# Patient Record
Sex: FEMALE | Race: WHITE | URBAN - METROPOLITAN AREA
[De-identification: names, ages, dates, MRNs, and addresses within clinical notes are randomized per-mention and may not be internally consistent; named-entity substitution may affect disease eponyms.]

---

## 2022-03-18 PROBLEM — G47.00 INSOMNIA: Status: ACTIVE | Noted: 2019-10-29

## 2022-03-18 PROBLEM — R00.2 PALPITATIONS: Status: ACTIVE | Noted: 2021-01-13

## 2022-03-18 PROBLEM — L30.9 ECZEMA: Status: ACTIVE | Noted: 2019-10-29

## 2022-03-18 PROBLEM — R73.03 PRE-DIABETES: Status: ACTIVE | Noted: 2021-04-29

## 2022-03-18 PROBLEM — K64.8 OTHER HEMORRHOIDS: Status: ACTIVE | Noted: 2021-04-29

## 2022-03-18 PROBLEM — F41.9 ANXIETY: Status: ACTIVE | Noted: 2018-06-08

## 2022-03-18 PROBLEM — R41.840 ATTENTION DEFICIT: Status: ACTIVE | Noted: 2020-08-12

## 2022-03-18 PROBLEM — K59.09 OTHER CONSTIPATION: Status: ACTIVE | Noted: 2021-03-26

## 2022-03-19 PROBLEM — R10.9 PAIN IN THE ABDOMEN: Status: ACTIVE | Noted: 2018-03-21

## 2022-03-19 PROBLEM — H52.03 HYPEROPIA OF BOTH EYES: Status: ACTIVE | Noted: 2021-05-10

## 2022-03-19 PROBLEM — F41.0 PANIC ATTACK: Status: ACTIVE | Noted: 2019-10-29

## 2022-03-19 PROBLEM — F32.A DEPRESSIVE DISORDER: Status: ACTIVE | Noted: 2018-06-08

## 2022-03-19 PROBLEM — Z91.09 ALLERGY TO POLLEN: Status: ACTIVE | Noted: 2018-06-08

## 2022-03-19 PROBLEM — M77.9 TENDONITIS: Status: ACTIVE | Noted: 2020-03-06

## 2022-03-19 PROBLEM — R73.01 IMPAIRED FASTING BLOOD SUGAR: Status: ACTIVE | Noted: 2020-11-12

## 2022-03-19 PROBLEM — M25.852 IMPINGEMENT SYNDROME, HIP, LEFT: Status: ACTIVE | Noted: 2020-03-06

## 2022-03-19 PROBLEM — M47.816 FACET ARTHROPATHY, LUMBAR: Status: ACTIVE | Noted: 2020-11-24

## 2022-03-19 PROBLEM — K21.9 ESOPHAGEAL REFLUX: Status: ACTIVE | Noted: 2018-03-21

## 2022-03-20 PROBLEM — R53.83 OTHER FATIGUE: Status: ACTIVE | Noted: 2021-04-29

## 2022-03-20 PROBLEM — G89.29 CHRONIC GENERALIZED PAIN: Status: ACTIVE | Noted: 2017-12-27

## 2022-03-20 PROBLEM — K82.9 GALLBLADDER DISEASE: Status: ACTIVE | Noted: 2021-03-29

## 2022-03-20 PROBLEM — R52 CHRONIC GENERALIZED PAIN: Status: ACTIVE | Noted: 2017-12-27

## 2022-03-20 PROBLEM — K59.9 FUNCTIONAL BOWEL DISEASE: Status: ACTIVE | Noted: 2018-03-21

## 2022-03-20 PROBLEM — M54.50 LOW BACK PAIN: Status: ACTIVE | Noted: 2020-09-25

## 2022-06-02 ENCOUNTER — TELEPHONE (OUTPATIENT)
Dept: OBGYN CLINIC | Age: 47
End: 2022-06-02

## 2022-06-02 NOTE — TELEPHONE ENCOUNTER
Patient called with concerns regarding pelvic pain that she has been having. She states that she went for imaging at [facility] and was found to have kidney stone. She also was found to have a complex lesion on her ovary. Results are in [place]. She would like recommendations from Dr. Keely Li regarding this. Records from [facility] Everywhere: \" Incidental complex and complicated cystic lesion, right ovary measuring 3.0 cm. Is may be related to hemorrhagic cysts, although other lesion not excluded. Consider follow-up pelvic ultrasound in 6-8 weeks. \"

## 2022-06-02 NOTE — TELEPHONE ENCOUNTER
Pt calls back, she is aware of Dr Rosario Payne recommendations. She is scheduled in July for ultrasound and to follow up with Dr Osmany Stark. All questions answered, call back prn. Voiced full understanding.

## 2022-07-07 NOTE — PROGRESS NOTES
Shonna Fierro  is a 49 y.o. female, No obstetric history on file., No LMP recorded. Patient has had a hysterectomy. ,  who is seen for ultrasound follow up to evaluate pelvic pain. She had imaging performed at [facility] that showed: \"Incidental complex and complicated cystic lesion, right ovary measuring 3.0 cm. Is may be related to hemorrhagic cysts, although other lesion not excluded. Consider follow-up pelvic ultrasound in 6-8 weeks. \"    Pt was advised to return to our office in 6 weeks for ultrasound follow up. Pt has c/o headaches, breast enlargement, she is wondering if this is related to hormones. She states that she has been having pelvic pain mostly on her LLQ and lower back, she states that she does have pain at times in her RLQ. She states that she has seen urology as well. HISTORY:  Sexual History:  single partner, contraception - status post hysterectomy  Contraception:  status post hysterectomy  Current Outpatient Medications on File Prior to Visit   Medication Sig Dispense Refill    busPIRone (BUSPAR) 7.5 MG tablet Take 7.5 mg by mouth 2 times daily      naproxen sodium (ANAPROX) 550 MG tablet Take 550 mg by mouth as needed      Multiple Vitamin (MULTIVITAMIN PO) Take by mouth      MAGNESIUM PO Take by mouth      Cholecalciferol 50 MCG (2000 UT) TABS Take by mouth      fluticasone (FLONASE) 50 MCG/ACT nasal spray by Nasal route      LORazepam (ATIVAN) 0.5 MG tablet Take 0.5 mg by mouth as needed. (Patient not taking: Reported on 7/8/2022)       No current facility-administered medications on file prior to visit.        ROS:  Review of Camillejudy Zhengroderick  has a past medical history of Abnormal Pap smear, Abnormal Papanicolaou smear of cervix, Anemia, Anxiety, Chronic pain, Depressive disorder, Esophageal reflux, Fibromyalgia, GERD (gastroesophageal reflux disease), Hiatal hernia, Impaired fasting blood sugar, Insomnia, Osteoarthritis of cervical spine without myelopathy, Other hemorrhoids, Psychiatric disorder, Unspecified adverse effect of anesthesia, and UTI (urinary tract infection). .    Previous surgeries include  has a past surgical history that includes gyn (, ); LEEP;  section (2014); Hysterectomy; and Cholecystectomy, laparoscopic. Lili Adams Her current meds are   Current Outpatient Medications:     busPIRone (BUSPAR) 7.5 MG tablet, Take 7.5 mg by mouth 2 times daily, Disp: , Rfl:     naproxen sodium (ANAPROX) 550 MG tablet, Take 550 mg by mouth as needed, Disp: , Rfl:     Multiple Vitamin (MULTIVITAMIN PO), Take by mouth, Disp: , Rfl:     MAGNESIUM PO, Take by mouth, Disp: , Rfl:     Cholecalciferol 50 MCG (2000 UT) TABS, Take by mouth, Disp: , Rfl:     fluticasone (FLONASE) 50 MCG/ACT nasal spray, by Nasal route, Disp: , Rfl:     LORazepam (ATIVAN) 0.5 MG tablet, Take 0.5 mg by mouth as needed. (Patient not taking: Reported on 2022), Disp: , Rfl:      Family history is significant for family history includes Diabetes in her father; Stroke in her mother. .       PHYSICAL EXAM:  Blood pressure 104/62, not currently breastfeeding. OBGyn Exam   The patient appears well, alert, oriented x 3, in no distress. Ultrasound today shows uterus to be absent right ovary of probable collapsing cyst left ovary with a dominant follicle approximately 1 cm bilateral adnexa appear normal.  Please see complete report under imaging tab. ASSESSMENT:  Encounter Diagnoses   Name Primary?  Pelvic pain in female     History of ovarian cyst Yes       PLAN:  Discussed her chronic pain and recurrent ovarian cyst with exacerbation of increasing symptoms at this time. These are becoming more bothersome with her discussed that I do think this is due to ovarian hormone fluctuation of the perimenopausal timeframe. She is more interested and possible hormonal therapies to help regulate this. She is also very concerned about side effects.   At this point discussed I think trying to suppress her ovaries to avoid fluctuation and suppress cysts would be our best plan of action. At this point we will go ahead and put her on 5 mg of Aygestin a day to see if this will suppress things. Discussed we often use 10 mg but I think lower dose will be better tolerated by her and she agrees. She already has an appointment for the end of August I think would be a good time to follow-up and see how she is doing. Orders Placed This Encounter   Procedures    AMB POC US, TRANSVAGINAL     Order Specific Question:   Reason for Exam:     Answer:   GYN     Order Specific Question:   Are you Pregnant? Answer:    No

## 2022-07-08 ENCOUNTER — OFFICE VISIT (OUTPATIENT)
Dept: OBGYN CLINIC | Age: 47
End: 2022-07-08
Payer: OTHER GOVERNMENT

## 2022-07-08 VITALS — SYSTOLIC BLOOD PRESSURE: 104 MMHG | DIASTOLIC BLOOD PRESSURE: 62 MMHG

## 2022-07-08 DIAGNOSIS — Z87.42 HISTORY OF OVARIAN CYST: Primary | ICD-10-CM

## 2022-07-08 DIAGNOSIS — R10.2 PELVIC PAIN IN FEMALE: ICD-10-CM

## 2022-07-08 PROCEDURE — 76830 TRANSVAGINAL US NON-OB: CPT | Performed by: OBSTETRICS & GYNECOLOGY

## 2022-07-08 PROCEDURE — 99214 OFFICE O/P EST MOD 30 MIN: CPT | Performed by: OBSTETRICS & GYNECOLOGY

## 2022-07-08 RX ORDER — BUSPIRONE HYDROCHLORIDE 7.5 MG/1
7.5 TABLET ORAL 2 TIMES DAILY
COMMUNITY
Start: 2021-11-01

## 2022-07-08 RX ORDER — NAPROXEN SODIUM 550 MG/1
550 TABLET ORAL PRN
COMMUNITY
Start: 2022-07-06

## 2022-08-22 ENCOUNTER — OFFICE VISIT (OUTPATIENT)
Dept: OBGYN CLINIC | Age: 47
End: 2022-08-22
Payer: OTHER GOVERNMENT

## 2022-08-22 VITALS
SYSTOLIC BLOOD PRESSURE: 92 MMHG | BODY MASS INDEX: 22.36 KG/M2 | HEIGHT: 64 IN | WEIGHT: 131 LBS | DIASTOLIC BLOOD PRESSURE: 64 MMHG

## 2022-08-22 DIAGNOSIS — R10.2 PELVIC PAIN IN FEMALE: ICD-10-CM

## 2022-08-22 DIAGNOSIS — Z01.419 WELL WOMAN EXAM: Primary | ICD-10-CM

## 2022-08-22 DIAGNOSIS — Z87.42 HISTORY OF OVARIAN CYST: ICD-10-CM

## 2022-08-22 DIAGNOSIS — Z13.89 SCREENING FOR GENITOURINARY CONDITION: ICD-10-CM

## 2022-08-22 LAB
BILIRUBIN, URINE, POC: NEGATIVE
BLOOD URINE, POC: NEGATIVE
GLUCOSE URINE, POC: NEGATIVE
KETONES, URINE, POC: NEGATIVE
LEUKOCYTE ESTERASE, URINE, POC: NEGATIVE
NITRITE, URINE, POC: NEGATIVE
PH, URINE, POC: 5 (ref 4.6–8)
PROTEIN,URINE, POC: NEGATIVE
SPECIFIC GRAVITY, URINE, POC: 1.01 (ref 1–1.03)
URINALYSIS CLARITY, POC: CLEAR
URINALYSIS COLOR, POC: YELLOW
UROBILINOGEN, POC: NORMAL

## 2022-08-22 PROCEDURE — 81002 URINALYSIS NONAUTO W/O SCOPE: CPT | Performed by: OBSTETRICS & GYNECOLOGY

## 2022-08-22 PROCEDURE — 99396 PREV VISIT EST AGE 40-64: CPT | Performed by: OBSTETRICS & GYNECOLOGY

## 2022-08-22 ASSESSMENT — PATIENT HEALTH QUESTIONNAIRE - PHQ9
SUM OF ALL RESPONSES TO PHQ QUESTIONS 1-9: 0
SUM OF ALL RESPONSES TO PHQ QUESTIONS 1-9: 0
SUM OF ALL RESPONSES TO PHQ9 QUESTIONS 1 & 2: 0
SUM OF ALL RESPONSES TO PHQ QUESTIONS 1-9: 0
2. FEELING DOWN, DEPRESSED OR HOPELESS: 0
1. LITTLE INTEREST OR PLEASURE IN DOING THINGS: 0
SUM OF ALL RESPONSES TO PHQ QUESTIONS 1-9: 0

## 2022-08-22 NOTE — PROGRESS NOTES
SECTION  2014    CHOLECYSTECTOMY, LAPAROSCOPIC      GYN  ,     LEEP x 2    LAPAROSCOPIC HYSTERECTOMY      has both ovaries    LEEP      x5     Social History     Socioeconomic History    Marital status:      Spouse name: Not on file    Number of children: Not on file    Years of education: Not on file    Highest education level: Not on file   Occupational History    Not on file   Tobacco Use    Smoking status: Some Days     Packs/day: 0.25     Types: Cigarettes    Smokeless tobacco: Never    Tobacco comments:     Quit smoking: maybe 3 cigarettes per day   Vaping Use    Vaping Use: Never used   Substance and Sexual Activity    Alcohol use: Not Currently    Drug use: No    Sexual activity: Yes     Partners: Male     Birth control/protection: Surgical   Other Topics Concern    Not on file   Social History Narrative    Not on file     Social Determinants of Health     Financial Resource Strain: Not on file   Food Insecurity: Not on file   Transportation Needs: Not on file   Physical Activity: Not on file   Stress: Not on file   Social Connections: Not on file   Intimate Partner Violence: Not on file   Housing Stability: Not on file     Family History   Problem Relation Age of Onset    Stroke Mother     Diabetes Father         Date Performed Result   PAP 20 Wnl hpv neg   Mammogram 18 Birads 2   Colonoscopy  Benign polyp   Dexa never      Review of Systems   All other systems reviewed and are negative. BP 92/64 (Site: Right Upper Arm, Position: Sitting)   Ht 5' 4\" (1.626 m)   Wt 131 lb (59.4 kg)   BMI 22.49 kg/m²      Physical Exam  Constitutional:       Appearance: Normal appearance. HENT:      Head: Normocephalic. Cardiovascular:      Rate and Rhythm: Normal rate and regular rhythm. Heart sounds: Normal heart sounds. Pulmonary:      Effort: Pulmonary effort is normal.      Breath sounds: Normal breath sounds.       Comments: Bilateral breast exam shows no skin changes, no retraction, no nipple changes or discharge, no masses, no supraclavicular masses and no axillary masses or nodules    Abdominal:      General: Abdomen is flat. Palpations: Abdomen is soft. There is no mass. Genitourinary:     General: Normal vulva. Comments: Bimanual exam with no palpable masses. Vaginal cuff appears normal.  Musculoskeletal:         General: Normal range of motion. Skin:     General: Skin is warm and dry. Neurological:      General: No focal deficit present. Mental Status: She is alert. Psychiatric:         Mood and Affect: Mood normal.         Tests:  Results for orders placed or performed in visit on 08/22/22   AMB POC URINALYSIS DIP STICK MANUAL W/O MICRO   Result Value Ref Range    Color (UA POC) Yellow     Clarity (UA POC) Clear     Glucose, Urine, POC Negative Negative    Bilirubin, Urine, POC Negative Negative    Ketones, Urine, POC Negative Negative    Specific Gravity, Urine, POC 1.015 1.001 - 1.035    Blood (UA POC) Negative Negative    pH, Urine, POC 5.0 4.6 - 8.0    Protein, Urine, POC Negative Negative    Urobilinogen, POC Normal     Nitrite, Urine, POC Negative Negative    Leukocyte Esterase, Urine, POC Negative Negative          Assessment/Plan  iNcole Sims was seen today for annual exam.    Diagnoses and all orders for this visit:    Well woman exam  -     [facility]ARCOS DIGITAL SCREEN BILATERAL; Future    Screening for genitourinary condition  -     AMB POC URINALYSIS DIP STICK MANUAL W/O MICRO    Pelvic pain in female    History of ovarian cyst     We discussed the rationale for treatment with Aygestin and she is considering using it. No follow-up provider specified.

## 2023-04-28 DIAGNOSIS — Z01.419 WELL WOMAN EXAM: ICD-10-CM

## 2023-06-19 ENCOUNTER — TELEPHONE (OUTPATIENT)
Dept: OBGYN CLINIC | Age: 48
End: 2023-06-19

## 2023-06-19 NOTE — TELEPHONE ENCOUNTER
Patient called stating that she had at CT scan at [address] [facility] showing \"cystic structure in mid pelvis 2.5 cm likely ovarian in etiology. \" She would like to schedule an appointment and is unsure if she needs to have an ultrasound at the same time. She is due for [facility] in August 2023. She has not started taking aygestin. Has follow up with PCP this week as well. Routed to Dr. Henry Willoughby for recommendations.

## 2023-06-19 NOTE — TELEPHONE ENCOUNTER
MD Nathaniel Herrera MA  Caller: Unspecified (Today,  1:08 PM)  It appears to be most likely functional cyst of the ovary. Would recommend ultrasound with visit to better visualize the cyst.     Patient is aware of recommendations. She states that she feels that she may be more menopausal now and asked about previous prescription that Dr. Salome Simon had written. She is currently on buspar 7.5 mg and klonopin PRN. She would like to know if these will interact with aygestin and if she could start taking this now. Advised her that she should wait to discuss with Dr. Salome Simon at her upcoming appointment. She is scheduled for 6/27/23 US and visit. All questions answered and pt advised to call back PRN.

## 2023-06-20 NOTE — TELEPHONE ENCOUNTER
Pt returned call and left voicemail asking if she can have hormone labs drawn at her upcoming visit on 6/27/23, she states that she has increased anxiety when she has cysts and she would like to have her hormone levels tested. She has annual visit with PCP tomorrow and will ask them if she cannot have them drawn here.

## 2023-06-20 NOTE — TELEPHONE ENCOUNTER
MD Seth Julio MA  Caller: Unspecified (Yesterday,  1:08 PM)  Should not be interaction with those. OK to start the Aygestin    Attempted to call pt, no answer and vm full.

## 2023-06-20 NOTE — TELEPHONE ENCOUNTER
MD Cori Tolentino MA  Caller: Unspecified (Yesterday,  1:08 PM)  We can discuss at visit what hormones to check and why     Called pt and advised her that Dr. Tami Phillips would discuss labs with her at her visit. She is aware that this is not a [facility] visit but a \"problem\" visit for ovarian cyst. She is concerned that the ovary may have \"shifted to the middle of her pelvis and is now causing so much pain. \" She is going to discuss with her PCP tomorrow as well. All questions answered and pt advised to call back PRN.

## 2023-06-27 ENCOUNTER — OFFICE VISIT (OUTPATIENT)
Dept: OBGYN CLINIC | Age: 48
End: 2023-06-27
Payer: OTHER GOVERNMENT

## 2023-06-27 VITALS — SYSTOLIC BLOOD PRESSURE: 106 MMHG | DIASTOLIC BLOOD PRESSURE: 60 MMHG | HEIGHT: 64 IN | BODY MASS INDEX: 22.49 KG/M2

## 2023-06-27 DIAGNOSIS — R10.2 PELVIC PAIN IN FEMALE: Primary | ICD-10-CM

## 2023-06-27 DIAGNOSIS — N95.1 MENOPAUSAL SYMPTOMS: ICD-10-CM

## 2023-06-27 PROCEDURE — 99214 OFFICE O/P EST MOD 30 MIN: CPT | Performed by: OBSTETRICS & GYNECOLOGY

## 2023-06-27 PROCEDURE — 76830 TRANSVAGINAL US NON-OB: CPT | Performed by: OBSTETRICS & GYNECOLOGY

## 2023-06-27 RX ORDER — FLUTICASONE PROPIONATE AND SALMETEROL XINAFOATE 115; 21 UG/1; UG/1
AEROSOL, METERED RESPIRATORY (INHALATION)
COMMUNITY
Start: 2023-05-25

## 2023-06-27 RX ORDER — CLONAZEPAM 0.5 MG/1
0.5 TABLET ORAL 2 TIMES DAILY PRN
COMMUNITY

## 2023-06-27 RX ORDER — OMEPRAZOLE 20 MG/1
20 CAPSULE, DELAYED RELEASE ORAL DAILY
COMMUNITY
Start: 2023-04-10

## 2023-06-27 RX ORDER — AZELASTINE 1 MG/ML
SPRAY, METERED NASAL
COMMUNITY
Start: 2023-06-22

## 2023-06-27 RX ORDER — DOXYCYCLINE HYCLATE 100 MG/1
CAPSULE ORAL
COMMUNITY
Start: 2023-06-19

## 2023-06-27 RX ORDER — PROGESTERONE 200 MG/1
200 CAPSULE ORAL NIGHTLY
Qty: 90 CAPSULE | Refills: 3 | Status: SHIPPED | OUTPATIENT
Start: 2023-06-27

## 2023-06-27 RX ORDER — ESTRADIOL 0.1 MG/D
1 FILM, EXTENDED RELEASE TRANSDERMAL
Qty: 24 PATCH | Refills: 3 | Status: SHIPPED | OUTPATIENT
Start: 2023-06-29

## 2023-06-27 RX ORDER — LEVOMEFOLATE CALCIUM 15 MG
1 TABLET ORAL DAILY
COMMUNITY
Start: 2023-05-26

## 2023-09-12 ENCOUNTER — OFFICE VISIT (OUTPATIENT)
Dept: OBGYN CLINIC | Age: 48
End: 2023-09-12
Payer: OTHER GOVERNMENT

## 2023-09-12 VITALS
DIASTOLIC BLOOD PRESSURE: 70 MMHG | SYSTOLIC BLOOD PRESSURE: 116 MMHG | HEIGHT: 64 IN | WEIGHT: 136 LBS | BODY MASS INDEX: 23.22 KG/M2

## 2023-09-12 DIAGNOSIS — Z13.89 SCREENING FOR GENITOURINARY CONDITION: ICD-10-CM

## 2023-09-12 DIAGNOSIS — N95.1 MENOPAUSAL SYMPTOMS: ICD-10-CM

## 2023-09-12 DIAGNOSIS — Z01.419 WELL WOMAN EXAM: Primary | ICD-10-CM

## 2023-09-12 LAB
BILIRUBIN, URINE, POC: NEGATIVE
BLOOD URINE, POC: NEGATIVE
ESTRADIOL SERPL-MCNC: 73.28 PG/ML
FSH SERPL-ACNC: 37.7 MIU/ML
GLUCOSE URINE, POC: NEGATIVE
KETONES, URINE, POC: NEGATIVE
LEUKOCYTE ESTERASE, URINE, POC: NEGATIVE
NITRITE, URINE, POC: NEGATIVE
PH, URINE, POC: 6 (ref 4.6–8)
PROTEIN,URINE, POC: NEGATIVE
SPECIFIC GRAVITY, URINE, POC: 1.01 (ref 1–1.03)
URINALYSIS CLARITY, POC: CLEAR
URINALYSIS COLOR, POC: NORMAL
UROBILINOGEN, POC: NORMAL

## 2023-09-12 PROCEDURE — 81002 URINALYSIS NONAUTO W/O SCOPE: CPT | Performed by: OBSTETRICS & GYNECOLOGY

## 2023-09-12 PROCEDURE — 99396 PREV VISIT EST AGE 40-64: CPT | Performed by: OBSTETRICS & GYNECOLOGY

## 2023-09-12 SDOH — ECONOMIC STABILITY: FOOD INSECURITY: WITHIN THE PAST 12 MONTHS, THE FOOD YOU BOUGHT JUST DIDN'T LAST AND YOU DIDN'T HAVE MONEY TO GET MORE.: NEVER TRUE

## 2023-09-12 SDOH — ECONOMIC STABILITY: TRANSPORTATION INSECURITY
IN THE PAST 12 MONTHS, HAS LACK OF TRANSPORTATION KEPT YOU FROM MEETINGS, WORK, OR FROM GETTING THINGS NEEDED FOR DAILY LIVING?: NO

## 2023-09-12 SDOH — ECONOMIC STABILITY: INCOME INSECURITY: HOW HARD IS IT FOR YOU TO PAY FOR THE VERY BASICS LIKE FOOD, HOUSING, MEDICAL CARE, AND HEATING?: NOT VERY HARD

## 2023-09-12 SDOH — ECONOMIC STABILITY: FOOD INSECURITY: WITHIN THE PAST 12 MONTHS, YOU WORRIED THAT YOUR FOOD WOULD RUN OUT BEFORE YOU GOT MONEY TO BUY MORE.: NEVER TRUE

## 2023-09-12 SDOH — ECONOMIC STABILITY: HOUSING INSECURITY
IN THE LAST 12 MONTHS, WAS THERE A TIME WHEN YOU DID NOT HAVE A STEADY PLACE TO SLEEP OR SLEPT IN A SHELTER (INCLUDING NOW)?: NO

## 2023-09-12 ASSESSMENT — PATIENT HEALTH QUESTIONNAIRE - PHQ9: DEPRESSION UNABLE TO ASSESS: PT REFUSES

## 2023-09-12 NOTE — PROGRESS NOTES
Clarity (UA POC) Clear     Glucose, Urine, POC Negative Negative    Bilirubin, Urine, POC Negative Negative    Ketones, Urine, POC Negative Negative    Specific Gravity, Urine, POC 1.010 1.001 - 1.035    Blood (UA POC) Negative Negative    pH, Urine, POC 6.0 4.6 - 8.0    Protein, Urine, POC Negative Negative    Urobilinogen, POC Normal     Nitrite, Urine, POC Negative Negative    Leukocyte Esterase, Urine, POC Negative Negative          Assessment/Plan  Kiara Self was seen today for annual exam.    Diagnoses and all orders for this visit:    Well woman exam    Screening for genitourinary condition  -     AMB POC URINALYSIS DIP STICK MANUAL W/O MICRO    Menopausal symptoms  -     Follicle Stimulating Hormone; Future  -     Estradiol; Future  -     Estradiol  -     Follicle Stimulating Hormone    Discussed with her reviewing the CT and ultrasound that this would have been an ovarian cyst that has resolved that was seen several months ago. Discussed continuing Pap smear screenings but every 3 to 5 years due to the fact that her hysterectomy was done related to cervical dysplasia. Discussed I would hold off on hormone replacement therapy with her diagnosed carotid plaque although it does not sound like it was significantly stenotic I think she is not at this point significantly symptomatic from hormonal point of view. Did discuss possibility of herbal supplements and she will consider that we will go ahead and look at some hormone levels to be able to let her know where those are at. But again at this point would on hormone replacement therapy. No follow-up provider specified.

## 2025-03-11 ENCOUNTER — OFFICE VISIT (OUTPATIENT)
Dept: OBGYN CLINIC | Age: 50
End: 2025-03-11
Payer: OTHER GOVERNMENT

## 2025-03-11 VITALS
DIASTOLIC BLOOD PRESSURE: 76 MMHG | BODY MASS INDEX: 23.32 KG/M2 | WEIGHT: 136.6 LBS | SYSTOLIC BLOOD PRESSURE: 124 MMHG | HEIGHT: 64 IN

## 2025-03-11 DIAGNOSIS — Z11.51 SPECIAL SCREENING EXAMINATION FOR HUMAN PAPILLOMAVIRUS (HPV): ICD-10-CM

## 2025-03-11 DIAGNOSIS — Z01.419 WELL WOMAN EXAM: Primary | ICD-10-CM

## 2025-03-11 DIAGNOSIS — Z13.89 SCREENING FOR GENITOURINARY CONDITION: ICD-10-CM

## 2025-03-11 LAB
BILIRUBIN, URINE, POC: NEGATIVE
BLOOD URINE, POC: NORMAL
GLUCOSE URINE, POC: NEGATIVE
HCV AB SER QL: NONREACTIVE
KETONES, URINE, POC: NEGATIVE
LEUKOCYTE ESTERASE, URINE, POC: NEGATIVE
NITRITE, URINE, POC: NEGATIVE
PH, URINE, POC: 5 (ref 4.6–8)
PROTEIN,URINE, POC: NEGATIVE
SPECIFIC GRAVITY, URINE, POC: 1.02 (ref 1–1.03)
URINALYSIS CLARITY, POC: CLEAR
URINALYSIS COLOR, POC: YELLOW
UROBILINOGEN, POC: NORMAL MG/DL

## 2025-03-11 PROCEDURE — 81002 URINALYSIS NONAUTO W/O SCOPE: CPT | Performed by: OBSTETRICS & GYNECOLOGY

## 2025-03-11 PROCEDURE — 99459 PELVIC EXAMINATION: CPT | Performed by: OBSTETRICS & GYNECOLOGY

## 2025-03-11 PROCEDURE — 99396 PREV VISIT EST AGE 40-64: CPT | Performed by: OBSTETRICS & GYNECOLOGY

## 2025-03-11 RX ORDER — CHOLESTYRAMINE 4 G/9G
POWDER, FOR SUSPENSION ORAL
COMMUNITY

## 2025-03-11 RX ORDER — VALACYCLOVIR HYDROCHLORIDE 500 MG/1
TABLET, FILM COATED ORAL
COMMUNITY
Start: 2022-01-11

## 2025-03-11 RX ORDER — BACLOFEN 10 MG/1
10 TABLET ORAL PRN
COMMUNITY
Start: 2024-01-11

## 2025-03-11 RX ORDER — LINACLOTIDE 145 UG/1
CAPSULE, GELATIN COATED ORAL
COMMUNITY
Start: 2024-10-11

## 2025-03-11 RX ORDER — VALACYCLOVIR HYDROCHLORIDE 500 MG/1
500 TABLET, FILM COATED ORAL DAILY
Qty: 90 TABLET | Refills: 3 | Status: SHIPPED | OUTPATIENT
Start: 2025-03-11

## 2025-03-11 RX ORDER — MOMETASONE FUROATE MONOHYDRATE 50 UG/1
SPRAY, METERED NASAL
COMMUNITY
Start: 2023-01-11

## 2025-03-11 RX ORDER — BENZOCAINE/MENTHOL 6 MG-10 MG
LOZENGE MUCOUS MEMBRANE
COMMUNITY
Start: 2023-01-11

## 2025-03-11 RX ORDER — TRETINOIN 0.25 MG/G
GEL TOPICAL
COMMUNITY
Start: 2023-01-11

## 2025-03-11 RX ORDER — CYCLOSPORINE 0.5 MG/ML
EMULSION OPHTHALMIC
COMMUNITY
Start: 2021-01-11

## 2025-03-11 SDOH — ECONOMIC STABILITY: FOOD INSECURITY: WITHIN THE PAST 12 MONTHS, YOU WORRIED THAT YOUR FOOD WOULD RUN OUT BEFORE YOU GOT MONEY TO BUY MORE.: NEVER TRUE

## 2025-03-11 SDOH — ECONOMIC STABILITY: FOOD INSECURITY: WITHIN THE PAST 12 MONTHS, THE FOOD YOU BOUGHT JUST DIDN'T LAST AND YOU DIDN'T HAVE MONEY TO GET MORE.: NEVER TRUE

## 2025-03-11 SDOH — ECONOMIC STABILITY: TRANSPORTATION INSECURITY
IN THE PAST 12 MONTHS, HAS THE LACK OF TRANSPORTATION KEPT YOU FROM MEDICAL APPOINTMENTS OR FROM GETTING MEDICATIONS?: NO

## 2025-03-11 SDOH — ECONOMIC STABILITY: INCOME INSECURITY: IN THE LAST 12 MONTHS, WAS THERE A TIME WHEN YOU WERE NOT ABLE TO PAY THE MORTGAGE OR RENT ON TIME?: NO

## 2025-03-12 ENCOUNTER — RESULTS FOLLOW-UP (OUTPATIENT)
Dept: OBGYN CLINIC | Age: 50
End: 2025-03-12

## 2025-03-17 LAB
COLLECTION METHOD: NORMAL
CYTOLOGIST CVX/VAG CYTO: NORMAL
CYTOLOGY CVX/VAG DOC THIN PREP: NORMAL
HPV APTIMA: NORMAL
Lab: NORMAL
Lab: NORMAL
PAP SOURCE: NORMAL
PATH REPORT.FINAL DX SPEC: NORMAL
PREV TREATMENT: NORMAL
STAT OF ADQ CVX/VAG CYTO-IMP: NORMAL

## 2025-04-24 ENCOUNTER — OFFICE VISIT (OUTPATIENT)
Dept: OBGYN CLINIC | Age: 50
End: 2025-04-24
Payer: OTHER GOVERNMENT

## 2025-04-24 VITALS
WEIGHT: 136.2 LBS | BODY MASS INDEX: 23.25 KG/M2 | SYSTOLIC BLOOD PRESSURE: 110 MMHG | DIASTOLIC BLOOD PRESSURE: 64 MMHG | HEIGHT: 64 IN

## 2025-04-24 DIAGNOSIS — Z12.72 FOLLOW-UP VAGINAL PAP SMEAR: ICD-10-CM

## 2025-04-24 DIAGNOSIS — Z12.72 FOLLOW-UP VAGINAL PAP SMEAR: Primary | ICD-10-CM

## 2025-04-24 DIAGNOSIS — Z87.410 HISTORY OF CERVICAL DYSPLASIA: ICD-10-CM

## 2025-04-24 PROCEDURE — 99212 OFFICE O/P EST SF 10 MIN: CPT | Performed by: OBSTETRICS & GYNECOLOGY

## 2025-04-24 NOTE — PROGRESS NOTES
Miri is here for repap.  No c/o. Patients last pap on 3/11/25 was wnl, however the HPV testing was unable to be performed.    Pelvic: NL EGBUS, vagina   Pap done.  Diagnoses and all orders for this visit:    Follow-up vaginal Pap smear  -     MISCELLANEOUS SENDOUT HPV with reflex to 16, 18, 45; Future    History of cervical dysplasia         No follow-up provider specified.

## 2025-04-28 LAB
Lab: NORMAL
Lab: NORMAL
REFERENCE LAB: NORMAL

## 2025-04-29 ENCOUNTER — RESULTS FOLLOW-UP (OUTPATIENT)
Dept: OBGYN CLINIC | Age: 50
End: 2025-04-29

## 2025-04-30 ENCOUNTER — TELEPHONE (OUTPATIENT)
Dept: OBGYN CLINIC | Age: 50
End: 2025-04-30